# Patient Record
Sex: FEMALE | Race: WHITE | NOT HISPANIC OR LATINO | URBAN - METROPOLITAN AREA
[De-identification: names, ages, dates, MRNs, and addresses within clinical notes are randomized per-mention and may not be internally consistent; named-entity substitution may affect disease eponyms.]

---

## 2025-03-13 ENCOUNTER — DOCUMENTATION (OUTPATIENT)
Dept: GASTROENTEROLOGY | Facility: HOSPITAL | Age: 40
End: 2025-03-13

## 2025-03-13 NOTE — PROGRESS NOTES
"Dr. Misael Johnson's office received a referral from JAGDEEP Diop for this patient to undergo a SIBO Breath Test for abdominal bloating. Dr. Alexis Rosas reviewed the referral on 3/13/2025 and approved proceeding with scheduling the procedure.    [facility] centralized schedulers were notified to call and schedule this patient. Contact Bell Hodge RN at [phone] for any questions.      Below is supporting clinical information form the referral sent by JAGDEEP Diop's office and the patient's medical records. Referral documents were scanned under Media, dated 3/10/2025.       SIBO Breath Test    Diagnosis: Abdominal bloating     Office Visit  3/10/2025  [facility] [place]  Yissel Turpin \"Yissel\" - 39 y.o. Female; born [birth year]Referral Summary, generated on Mar. 10, 2025March 10, 2025  Reason for Referral     Consultation (Routine) - Pending Review  Reason for Referral - Consultation (Routine) - Pending Review  Specialty          Diagnoses / Procedures          Referred By Contact           Referred To Contact  Gastroenterology        Diagnoses  Abdominal bloating     Procedures  [place] OFFICE/OUTPATIENT [facility] MDM 60 MINUTES             Maria A Acevedo APRN - CNP  [address] [place] [id number]  Phone: tel:[phone]  fax:[phone] Misael Johnson DO  [address] [facility] [address]  [place] [address]  Phone: tel:[phone]  fax:[phone]     Reason for Visit     Reason for Visit -  Reason Comments  Follow-up         Pt presents for follow-up abdominal pain (last in office 12.12.2024 and saw Harpreet Dickey PA-C), pt stated that she is still having some abdominal pain (has improved but still dull pain), pt has still been having bloating     Patient Instructions  ELIZABETH Diop CNP - 03/10/2025 10:30 AM EDT  Formatting of this note might be different from the original.     --Please call office with any questions or " concerns! [phone]  --Schedule EGD (upper endoscopy)  --Start pantoprazole 40 mg daily  --referral placed for SIBO breath test  --recommend low FODMAP diet  --Avoid nonsteroidal anti-inflammatory (NSAID) medications such as ibuprofen (Advil), naproxen (Aleve), etc. These can contribute to abdominal pain and ulcers. Take Tylenol (acetaminophen) instead if needed for pain by following the instructions on the bottle.  --Please see handout provided regarding additional recommendations for the symptoms including when to seek emergency care or further treatment.  --Follow-up with PCP, and in GI clinic to be determined pending results of above.  Electronically signed by ELIZABETH Diop CNP at 03/10/2025 11:08 AM EDT  Electronically signed by ELIZABETH Diop CNP at 03/10/2025 11:09 AM EDT  Electronically signed by ELIZABETH Diop CNP at 03/10/2025 11:11 AM EDT

## 2025-05-08 ENCOUNTER — OFFICE VISIT (OUTPATIENT)
Dept: GASTROENTEROLOGY | Facility: CLINIC | Age: 40
End: 2025-05-08
Payer: MEDICAID

## 2025-05-08 VITALS
DIASTOLIC BLOOD PRESSURE: 72 MMHG | BODY MASS INDEX: 25.27 KG/M2 | WEIGHT: 161 LBS | TEMPERATURE: 98 F | HEIGHT: 67 IN | SYSTOLIC BLOOD PRESSURE: 107 MMHG | HEART RATE: 85 BPM

## 2025-05-08 DIAGNOSIS — R14.0 ABDOMINAL BLOATING: ICD-10-CM

## 2025-05-08 NOTE — PROGRESS NOTES
Patient ID: Yissel Turpin is a 39 y.o. female.    Breath Hydrogen Test-Bacterial Overgrowth    Date/Time: 5/8/2025 3:11 PM    Performed by: Celina Gutierrez MA  Authorized by: Alexis Rosas MD    Consent:     Consent obtained:  Verbal    Consent given by:  Patient  Universal protocol:     Procedure explained and questions answered to patient or proxy's satisfaction: yes    Post-procedure details:     Procedure completion:  Tolerated  Hydrogen Breath Analysis Consultation Sheet    Referring Provider: JAGDEEP Robins  [address] [place] [address]    Indication: rule out SIBO    Symptoms: abdominal bloating    Age: 39 y.o.  Weight:   Vitals:    05/08/25 1510   Weight: 73 kg (161 lb)     Substrate: Dextrose   Dose: 75 gram    Last Meal: Eggs- 8:30 pm  Recent Antibiotics: patient denies    RESULTS:   Time PPM (H2) APPM* (CH4) CO2 Correction   Baseline #1 1307 2 9 3.1 1.77   Baseline #2 1309 3 9 3.5 1.57   *Challenge Dose Sugar: 75 gram Dextrose at 1315  15' 1330 3 7 3.8 1.44   30' 1345 3 8 3.4 1.61   45' 1400 3 8 3.4 1.61   60' 1415 3 8 3.5 1.57   75' 1430 3 8 3.5 1.57   90' 1445 2 6 3.5 1.57   105' 1500 2 8 3.5 1.57   120'        135'        150'        165'        180'          Impression: Negative for SIBO and methane

## 2025-05-08 NOTE — LETTER
May 8, 2025     JAGDEEP Robins  [address] [place] [id number]    Patient: Yissel Turpin   YOB: 1985   Date of Visit: 5/8/2025       Dear JAGDEEP Cruz:    Thank you for referring Yissel Turpin for a hydrogen breath test. Her breath test was negative for SIBO and methane.        Sincerely,     JAGDEEP Laureano      CC: Alexis Rosas MD  ______________________________________________________________________________________